# Patient Record
Sex: MALE | Race: BLACK OR AFRICAN AMERICAN | NOT HISPANIC OR LATINO
[De-identification: names, ages, dates, MRNs, and addresses within clinical notes are randomized per-mention and may not be internally consistent; named-entity substitution may affect disease eponyms.]

---

## 2023-11-03 ENCOUNTER — APPOINTMENT (OUTPATIENT)
Dept: ORTHOPEDIC SURGERY | Facility: CLINIC | Age: 33
End: 2023-11-03
Payer: COMMERCIAL

## 2023-11-03 VITALS — HEIGHT: 77 IN | BODY MASS INDEX: 21.37 KG/M2 | WEIGHT: 181 LBS | RESPIRATION RATE: 16 BRPM

## 2023-11-03 DIAGNOSIS — Z86.59 PERSONAL HISTORY OF OTHER MENTAL AND BEHAVIORAL DISORDERS: ICD-10-CM

## 2023-11-03 DIAGNOSIS — F12.91 CANNABIS USE, UNSPECIFIED, IN REMISSION: ICD-10-CM

## 2023-11-03 DIAGNOSIS — Z78.9 OTHER SPECIFIED HEALTH STATUS: ICD-10-CM

## 2023-11-03 PROBLEM — Z00.00 ENCOUNTER FOR PREVENTIVE HEALTH EXAMINATION: Status: ACTIVE | Noted: 2023-11-03

## 2023-11-03 PROCEDURE — 99204 OFFICE O/P NEW MOD 45 MIN: CPT

## 2023-11-03 PROCEDURE — 73130 X-RAY EXAM OF HAND: CPT | Mod: RT

## 2023-11-03 RX ORDER — ACETAMINOPHEN AND CODEINE PHOSPHATE 300; 30 MG/1; MG/1
300-30 TABLET ORAL
Qty: 20 | Refills: 0 | Status: ACTIVE | COMMUNITY
Start: 2023-11-03 | End: 1900-01-01

## 2023-11-03 RX ORDER — SERTRALINE HYDROCHLORIDE 25 MG/1
TABLET, FILM COATED ORAL
Refills: 0 | Status: ACTIVE | COMMUNITY

## 2023-11-05 ENCOUNTER — TRANSCRIPTION ENCOUNTER (OUTPATIENT)
Age: 33
End: 2023-11-05

## 2023-11-06 ENCOUNTER — TRANSCRIPTION ENCOUNTER (OUTPATIENT)
Age: 33
End: 2023-11-06

## 2023-11-06 ENCOUNTER — APPOINTMENT (OUTPATIENT)
Dept: ORTHOPEDIC SURGERY | Facility: AMBULATORY SURGERY CENTER | Age: 33
End: 2023-11-06

## 2023-11-06 ENCOUNTER — OUTPATIENT (OUTPATIENT)
Dept: OUTPATIENT SERVICES | Facility: HOSPITAL | Age: 33
LOS: 1 days | Discharge: HOME | End: 2023-11-06
Payer: COMMERCIAL

## 2023-11-06 VITALS
HEART RATE: 88 BPM | DIASTOLIC BLOOD PRESSURE: 94 MMHG | OXYGEN SATURATION: 98 % | HEIGHT: 77 IN | RESPIRATION RATE: 14 BRPM | SYSTOLIC BLOOD PRESSURE: 137 MMHG | WEIGHT: 172.84 LBS | TEMPERATURE: 98 F

## 2023-11-06 VITALS
TEMPERATURE: 98 F | RESPIRATION RATE: 16 BRPM | OXYGEN SATURATION: 98 % | SYSTOLIC BLOOD PRESSURE: 134 MMHG | DIASTOLIC BLOOD PRESSURE: 86 MMHG | HEART RATE: 86 BPM

## 2023-11-06 PROCEDURE — 26735 TREAT FINGER FRACTURE EACH: CPT

## 2023-11-06 DEVICE — K-WIRE BRASSELER (EXTRA SHARP) DOUBLE DIAMOND 0.7MM X 4": Type: IMPLANTABLE DEVICE | Site: RIGHT | Status: FUNCTIONAL

## 2023-11-06 DEVICE — IMPLANTABLE DEVICE: Type: IMPLANTABLE DEVICE | Site: RIGHT | Status: FUNCTIONAL

## 2023-11-06 DEVICE — K-WIRE BRASSELER (EXTRA SHARP) DOUBLE DIAMOND 1.1MM X 4": Type: IMPLANTABLE DEVICE | Site: RIGHT | Status: FUNCTIONAL

## 2023-11-06 RX ORDER — SERTRALINE 25 MG/1
1 TABLET, FILM COATED ORAL
Refills: 0 | DISCHARGE

## 2023-11-06 RX ORDER — SODIUM CHLORIDE 9 MG/ML
500 INJECTION, SOLUTION INTRAVENOUS
Refills: 0 | Status: DISCONTINUED | OUTPATIENT
Start: 2023-11-06 | End: 2023-11-06

## 2023-11-06 RX ORDER — ACETAMINOPHEN 500 MG
650 TABLET ORAL ONCE
Refills: 0 | Status: DISCONTINUED | OUTPATIENT
Start: 2023-11-06 | End: 2023-11-06

## 2023-11-06 NOTE — PRE-ANESTHESIA EVALUATION ADULT - NSDENTALSD_ENT_ALL_CORE
Missing multiple teeth to upper left and right; significantly chipped front right tooth/missing teeth

## 2023-11-06 NOTE — PRE-ANESTHESIA EVALUATION ADULT - NSANTHADDINFOFT_GEN_ALL_CORE
Anesthetic plan reviewed. Patient safety emphasized.     Discussed risks of general anesthesia, sedation, and peripheral nerve block - including risks of bleeding, infection, and damage to surrounding structures (including nerve injury). All questions answered and patient elects to proceed.

## 2023-11-06 NOTE — PRE-ANESTHESIA EVALUATION ADULT - NSANTHPMHFT_GEN_ALL_CORE
34yo M with PMH s/f mild HTN not on medication and anxiety managed with Zoloft presenting for right small finger proximal phalanx closed reduction with percutaneous pinning versus ORIF. 32yo M marijuana user with PMH s/f mild HTN not on medication and anxiety managed with Zoloft presenting for right small finger proximal phalanx closed reduction with percutaneous pinning versus ORIF.

## 2023-11-17 ENCOUNTER — APPOINTMENT (OUTPATIENT)
Dept: ORTHOPEDIC SURGERY | Facility: CLINIC | Age: 33
End: 2023-11-17
Payer: COMMERCIAL

## 2023-11-17 PROBLEM — F41.9 ANXIETY DISORDER, UNSPECIFIED: Chronic | Status: ACTIVE | Noted: 2023-11-06

## 2023-11-17 PROBLEM — I10 ESSENTIAL (PRIMARY) HYPERTENSION: Chronic | Status: ACTIVE | Noted: 2023-11-06

## 2023-11-17 PROCEDURE — 29085 APPL CAST HAND&LWR FOREARM: CPT | Mod: 58

## 2023-11-17 PROCEDURE — 99024 POSTOP FOLLOW-UP VISIT: CPT

## 2023-11-17 PROCEDURE — 73140 X-RAY EXAM OF FINGER(S): CPT

## 2023-12-22 ENCOUNTER — APPOINTMENT (OUTPATIENT)
Dept: ORTHOPEDIC SURGERY | Facility: CLINIC | Age: 33
End: 2023-12-22
Payer: COMMERCIAL

## 2023-12-22 DIAGNOSIS — S69.90XD UNSPECIFIED INJURY OF UNSPECIFIED WRIST, HAND AND FINGER(S), SUBSEQUENT ENCOUNTER: ICD-10-CM

## 2023-12-22 PROCEDURE — 73140 X-RAY EXAM OF FINGER(S): CPT

## 2023-12-22 PROCEDURE — 99024 POSTOP FOLLOW-UP VISIT: CPT

## 2023-12-22 NOTE — HISTORY OF PRESENT ILLNESS
[de-identified] : DOS: 11/6/23 (6 wks, 4 days) [de-identified] : Patient's returning for post op follow up after undergoing a right small finger P1 fracture ORIF 6.5 weeks ago. I discussed things with his therapist at [facility], Martine Alejandre, who reported that he's been diligent with attending therapy but is having difficulty with home exercises  and pushing the limits of passive motion. [de-identified] : Healed right small finger ulnar incision at P1. No signs of infection. Nontender over the P1. No malrotation or gross deformity. Well-perfused digit. Sensation is intact to light touch along the radial and ulnar aspects of the right small finger.  Decreased active and passive digital ROM at the right small finger MCP joint: 0 to 90 degrees PIP joint: -30 to 50 degrees active motion (passive motion from -10 to 90 degrees flexion) DIP joint: __ [de-identified] : PA, oblique and lateral x-rays of the right small finger were obtained today to assess the fracture. Satisfactory reduction of the proximal phalanx fracture along with position of the ulnar-sided plate and screws. Interval bony healing noted.  [de-identified] : I explained that the patient is progressing slowly post op. I reassured him that satisfactory bony healing is taking place and emphasized the importance of really pushing the limits of active and passive motion, especially in the early stages post op as delay in this would lead to more permanent stiffness. I encouraged greater incorporation of the digit in daily activities and instructed exercises to push more motion. I directed he follow up with me in 6 weeks to monitor progress.

## 2025-06-02 NOTE — ASU PATIENT PROFILE, ADULT - ABILITY TO HEAR (WITH HEARING AID OR HEARING APPLIANCE IF NORMALLY USED):
Detail Level: Detailed Biopsy Photograph Reviewed: Yes Accession # (Optional): [id number] Number Of Curettages: 3 Size Of Lesion In Cm: 1.5 Size Of Lesion After Curettage: 2 Add Intralesional Injection: No Total Volume (Ccs): 1 Anesthesia Type: 1% lidocaine without epinephrine Cautery Type: aluminum chloride and electrodesiccation What Was Performed First?: Curettage Final Size Statement: The size of the lesion after curettage was Additional Information: (Optional): The wound was cleaned, and a bandaid was applied.  The patient received detailed post-op instructions. Consent was obtained from the patient. The risks, benefits and alternatives to therapy were discussed in detail. Specifically, the risks of infection, scarring, bleeding, prolonged wound healing, nerve injury, incomplete removal, allergy to anesthesia and recurrence were addressed. Alternatives to ED&C, such as: surgical removal and XRT were also discussed.  Prior to the procedure, the treatment site was clearly identified and confirmed by the patient. All components of Universal Protocol/PAUSE Rule completed. Post-Care Instructions: I reviewed with the patient in detail post-care instructions. Patient is to keep the area dry for 24 hours, and not to engage in any swimming until the area is healed. Should the patient develop any fevers, chills, bleeding, severe pain patient will contact the office immediately. Bill As A Line Item Or As Units: Line Item Adequate: hears normal conversation without difficulty

## (undated) DEVICE — GLV 8.5 PROTEXIS (WHITE)

## (undated) DEVICE — TOURNIQUET CUFF 18" DUAL PORT SINGLE BLADDER W PLC  (BLACK)

## (undated) DEVICE — Device

## (undated) DEVICE — MARKING PEN W RULER

## (undated) DEVICE — SUT MONOCRYL 5-0 18" P-3 UNDYED

## (undated) DEVICE — PACK ORTHO ELBOW HAND

## (undated) DEVICE — BEAVER BLADE MIN-BLADE ROUNDED TIP 1-SIDE SHARP (GREEN)

## (undated) DEVICE — GLV 8 PROTEXIS (WHITE)

## (undated) DEVICE — SLV COMPRESSION KNEE MED

## (undated) DEVICE — WARMING BLANKET LOWER ADULT